# Patient Record
Sex: FEMALE | URBAN - METROPOLITAN AREA
[De-identification: names, ages, dates, MRNs, and addresses within clinical notes are randomized per-mention and may not be internally consistent; named-entity substitution may affect disease eponyms.]

---

## 2023-06-22 ENCOUNTER — APPOINTMENT (OUTPATIENT)
Dept: URBAN - METROPOLITAN AREA SURGERY 18 | Age: 48
Setting detail: DERMATOLOGY
End: 2023-06-24

## 2023-06-22 VITALS — HEART RATE: 73 BPM | DIASTOLIC BLOOD PRESSURE: 61 MMHG | SYSTOLIC BLOOD PRESSURE: 90 MMHG

## 2023-06-22 PROBLEM — C44.319 BASAL CELL CARCINOMA OF SKIN OF OTHER PARTS OF FACE: Status: ACTIVE | Noted: 2023-06-22

## 2023-06-22 PROCEDURE — 17312 MOHS ADDL STAGE: CPT

## 2023-06-22 PROCEDURE — 17311 MOHS 1 STAGE H/N/HF/G: CPT

## 2023-06-22 PROCEDURE — OTHER MOHS SURGERY: OTHER

## 2023-06-22 PROCEDURE — OTHER MIPS QUALITY: OTHER

## 2023-06-22 PROCEDURE — OTHER REFERRAL CORRESPONDENCE: OTHER

## 2023-06-22 PROCEDURE — OTHER COUNSELING: OTHER

## 2023-06-22 NOTE — PROCEDURE: MOHS SURGERY
EXAM: CHEST ONE VIEW.



HISTORY: Cardiac disease.



COMPARISON: None.



FINDINGS: A frontal view of the chest is obtained.



There are no confluent infiltrates. There is no pneumothorax or pleural effusion. The heart is not en
larged.



IMPRESSION: 

1. No confluent infiltrates.



Electronically signed by: PATTIE Jennings MD (7/1/2021 7:48 PM) [facility] Helical Rim Text: The closure involved the helical rim.